# Patient Record
Sex: FEMALE | Race: BLACK OR AFRICAN AMERICAN | NOT HISPANIC OR LATINO | Employment: UNEMPLOYED | URBAN - METROPOLITAN AREA
[De-identification: names, ages, dates, MRNs, and addresses within clinical notes are randomized per-mention and may not be internally consistent; named-entity substitution may affect disease eponyms.]

---

## 2020-07-27 DIAGNOSIS — O35.9XX0 SUSPECTED FETAL ANOMALY, ANTEPARTUM, SINGLE OR UNSPECIFIED FETUS: Primary | ICD-10-CM

## 2020-10-28 ENCOUNTER — HOSPITAL ENCOUNTER (INPATIENT)
Facility: OTHER | Age: 25
LOS: 2 days | Discharge: HOME | End: 2020-10-30
Attending: OBSTETRICS & GYNECOLOGY | Admitting: OBSTETRICS & GYNECOLOGY
Payer: MEDICAID

## 2020-10-28 ENCOUNTER — ANESTHESIA EVENT (OUTPATIENT)
Dept: OBSTETRICS AND GYNECOLOGY | Facility: OTHER | Age: 25
End: 2020-10-28
Payer: MEDICAID

## 2020-10-28 ENCOUNTER — ANESTHESIA (OUTPATIENT)
Dept: OBSTETRICS AND GYNECOLOGY | Facility: OTHER | Age: 25
End: 2020-10-28
Payer: MEDICAID

## 2020-10-28 DIAGNOSIS — Z3A.38 38 WEEKS GESTATION OF PREGNANCY: ICD-10-CM

## 2020-10-28 DIAGNOSIS — Z37.9 NORMAL LABOR: ICD-10-CM

## 2020-10-28 LAB
ABO + RH BLD: NORMAL
BASOPHILS # BLD AUTO: 0.02 K/UL (ref 0–0.2)
BASOPHILS NFR BLD: 0.2 % (ref 0–1.9)
BLD GP AB SCN CELLS X3 SERPL QL: NORMAL
DIFFERENTIAL METHOD: ABNORMAL
EOSINOPHIL # BLD AUTO: 0.1 K/UL (ref 0–0.5)
EOSINOPHIL NFR BLD: 0.7 % (ref 0–8)
ERYTHROCYTE [DISTWIDTH] IN BLOOD BY AUTOMATED COUNT: 13.1 % (ref 11.5–14.5)
HCT VFR BLD AUTO: 31.7 % (ref 37–48.5)
HGB BLD-MCNC: 10.6 G/DL (ref 12–16)
IMM GRANULOCYTES # BLD AUTO: 0.02 K/UL (ref 0–0.04)
IMM GRANULOCYTES NFR BLD AUTO: 0.2 % (ref 0–0.5)
LYMPHOCYTES # BLD AUTO: 2.2 K/UL (ref 1–4.8)
LYMPHOCYTES NFR BLD: 25.7 % (ref 18–48)
MCH RBC QN AUTO: 31.3 PG (ref 27–31)
MCHC RBC AUTO-ENTMCNC: 33.4 G/DL (ref 32–36)
MCV RBC AUTO: 94 FL (ref 82–98)
MONOCYTES # BLD AUTO: 0.7 K/UL (ref 0.3–1)
MONOCYTES NFR BLD: 7.9 % (ref 4–15)
NEUTROPHILS # BLD AUTO: 5.5 K/UL (ref 1.8–7.7)
NEUTROPHILS NFR BLD: 65.3 % (ref 38–73)
NRBC BLD-RTO: 0 /100 WBC
PLATELET # BLD AUTO: 282 K/UL (ref 150–350)
PMV BLD AUTO: 12.9 FL (ref 9.2–12.9)
RBC # BLD AUTO: 3.39 M/UL (ref 4–5.4)
SARS-COV-2 RDRP RESP QL NAA+PROBE: NEGATIVE
WBC # BLD AUTO: 8.45 K/UL (ref 3.9–12.7)

## 2020-10-28 PROCEDURE — U0002 COVID-19 LAB TEST NON-CDC: HCPCS

## 2020-10-28 PROCEDURE — 25000003 PHARM REV CODE 250: Performed by: STUDENT IN AN ORGANIZED HEALTH CARE EDUCATION/TRAINING PROGRAM

## 2020-10-28 PROCEDURE — 59025 FETAL NON-STRESS TEST: CPT

## 2020-10-28 PROCEDURE — 59025 FETAL NON-STRESS TEST: CPT | Mod: 26,,, | Performed by: OBSTETRICS & GYNECOLOGY

## 2020-10-28 PROCEDURE — C1751 CATH, INF, PER/CENT/MIDLINE: HCPCS | Performed by: ANESTHESIOLOGY

## 2020-10-28 PROCEDURE — 59025 PR FETAL 2N-STRESS TEST: ICD-10-PCS | Mod: 26,,, | Performed by: OBSTETRICS & GYNECOLOGY

## 2020-10-28 PROCEDURE — 59409 OBSTETRICAL CARE: CPT | Mod: AA,,, | Performed by: ANESTHESIOLOGY

## 2020-10-28 PROCEDURE — 99285 EMERGENCY DEPT VISIT HI MDM: CPT | Mod: 25

## 2020-10-28 PROCEDURE — 59409 PR OBSTETRICAL CARE,VAG DELIV ONLY: ICD-10-PCS | Mod: AT,,, | Performed by: OBSTETRICS & GYNECOLOGY

## 2020-10-28 PROCEDURE — 87081 CULTURE SCREEN ONLY: CPT

## 2020-10-28 PROCEDURE — 99283 EMERGENCY DEPT VISIT LOW MDM: CPT | Mod: 25,,, | Performed by: OBSTETRICS & GYNECOLOGY

## 2020-10-28 PROCEDURE — 63600175 PHARM REV CODE 636 W HCPCS: Performed by: STUDENT IN AN ORGANIZED HEALTH CARE EDUCATION/TRAINING PROGRAM

## 2020-10-28 PROCEDURE — 86850 RBC ANTIBODY SCREEN: CPT

## 2020-10-28 PROCEDURE — 87147 CULTURE TYPE IMMUNOLOGIC: CPT

## 2020-10-28 PROCEDURE — 11000001 HC ACUTE MED/SURG PRIVATE ROOM

## 2020-10-28 PROCEDURE — 85025 COMPLETE CBC W/AUTO DIFF WBC: CPT

## 2020-10-28 PROCEDURE — 59409 PRA ETRICAL CARE,VAG DELIV ONLY: ICD-10-PCS | Mod: AA,,, | Performed by: ANESTHESIOLOGY

## 2020-10-28 PROCEDURE — 72200005 HC VAGINAL DELIVERY LEVEL II

## 2020-10-28 PROCEDURE — 59409 OBSTETRICAL CARE: CPT | Mod: AT,,, | Performed by: OBSTETRICS & GYNECOLOGY

## 2020-10-28 PROCEDURE — 27200710 HC EPIDURAL INFUSION PUMP SET: Performed by: ANESTHESIOLOGY

## 2020-10-28 PROCEDURE — 99283 PR EMERGENCY DEPT VISIT,LEVEL III: ICD-10-PCS | Mod: 25,,, | Performed by: OBSTETRICS & GYNECOLOGY

## 2020-10-28 PROCEDURE — 62326 NJX INTERLAMINAR LMBR/SAC: CPT | Performed by: STUDENT IN AN ORGANIZED HEALTH CARE EDUCATION/TRAINING PROGRAM

## 2020-10-28 RX ORDER — IBUPROFEN 600 MG/1
600 TABLET ORAL EVERY 6 HOURS
Status: DISCONTINUED | OUTPATIENT
Start: 2020-10-28 | End: 2020-10-30 | Stop reason: HOSPADM

## 2020-10-28 RX ORDER — OXYCODONE AND ACETAMINOPHEN 10; 325 MG/1; MG/1
1 TABLET ORAL EVERY 4 HOURS PRN
Status: DISCONTINUED | OUTPATIENT
Start: 2020-10-28 | End: 2020-10-30 | Stop reason: HOSPADM

## 2020-10-28 RX ORDER — OXYTOCIN/RINGER'S LACTATE 30/500 ML
95 PLASTIC BAG, INJECTION (ML) INTRAVENOUS ONCE
Status: DISCONTINUED | OUTPATIENT
Start: 2020-10-28 | End: 2020-10-28 | Stop reason: ALTCHOICE

## 2020-10-28 RX ORDER — MISOPROSTOL 200 UG/1
TABLET ORAL
Status: DISCONTINUED
Start: 2020-10-28 | End: 2020-10-28 | Stop reason: WASHOUT

## 2020-10-28 RX ORDER — CALCIUM CARBONATE 200(500)MG
500 TABLET,CHEWABLE ORAL 3 TIMES DAILY PRN
Status: DISCONTINUED | OUTPATIENT
Start: 2020-10-28 | End: 2020-10-28 | Stop reason: ALTCHOICE

## 2020-10-28 RX ORDER — FENTANYL CITRATE 50 UG/ML
INJECTION, SOLUTION INTRAMUSCULAR; INTRAVENOUS
Status: DISCONTINUED | OUTPATIENT
Start: 2020-10-28 | End: 2020-10-28

## 2020-10-28 RX ORDER — FAMOTIDINE 10 MG/ML
20 INJECTION INTRAVENOUS ONCE
Status: CANCELLED | OUTPATIENT
Start: 2020-10-28 | End: 2020-10-28

## 2020-10-28 RX ORDER — CEFAZOLIN SODIUM 1 G/3ML
2 INJECTION, POWDER, FOR SOLUTION INTRAMUSCULAR; INTRAVENOUS ONCE AS NEEDED
Status: DISCONTINUED | OUTPATIENT
Start: 2020-10-28 | End: 2020-10-28 | Stop reason: ALTCHOICE

## 2020-10-28 RX ORDER — BUPIVACAINE HYDROCHLORIDE 2.5 MG/ML
INJECTION, SOLUTION EPIDURAL; INFILTRATION; INTRACAUDAL
Status: DISPENSED
Start: 2020-10-28 | End: 2020-10-28

## 2020-10-28 RX ORDER — SODIUM CITRATE AND CITRIC ACID MONOHYDRATE 334; 500 MG/5ML; MG/5ML
30 SOLUTION ORAL ONCE
Status: CANCELLED | OUTPATIENT
Start: 2020-10-28 | End: 2020-10-28

## 2020-10-28 RX ORDER — SIMETHICONE 80 MG
1 TABLET,CHEWABLE ORAL EVERY 6 HOURS PRN
Status: DISCONTINUED | OUTPATIENT
Start: 2020-10-28 | End: 2020-10-30 | Stop reason: HOSPADM

## 2020-10-28 RX ORDER — DIPHENHYDRAMINE HCL 25 MG
25 CAPSULE ORAL EVERY 4 HOURS PRN
Status: DISCONTINUED | OUTPATIENT
Start: 2020-10-28 | End: 2020-10-30 | Stop reason: HOSPADM

## 2020-10-28 RX ORDER — CARBOPROST TROMETHAMINE 250 UG/ML
INJECTION, SOLUTION INTRAMUSCULAR
Status: DISCONTINUED
Start: 2020-10-28 | End: 2020-10-28 | Stop reason: WASHOUT

## 2020-10-28 RX ORDER — SODIUM CHLORIDE, SODIUM LACTATE, POTASSIUM CHLORIDE, CALCIUM CHLORIDE 600; 310; 30; 20 MG/100ML; MG/100ML; MG/100ML; MG/100ML
INJECTION, SOLUTION INTRAVENOUS CONTINUOUS
Status: DISCONTINUED | OUTPATIENT
Start: 2020-10-28 | End: 2020-10-28 | Stop reason: ALTCHOICE

## 2020-10-28 RX ORDER — FENTANYL/BUPIVACAINE/NS/PF 2MCG/ML-.1
PLASTIC BAG, INJECTION (ML) INJECTION CONTINUOUS PRN
Status: DISCONTINUED | OUTPATIENT
Start: 2020-10-28 | End: 2020-10-28

## 2020-10-28 RX ORDER — HYDROCODONE BITARTRATE AND ACETAMINOPHEN 5; 325 MG/1; MG/1
1 TABLET ORAL EVERY 4 HOURS PRN
Status: DISCONTINUED | OUTPATIENT
Start: 2020-10-28 | End: 2020-10-30 | Stop reason: HOSPADM

## 2020-10-28 RX ORDER — ONDANSETRON 8 MG/1
8 TABLET, ORALLY DISINTEGRATING ORAL EVERY 8 HOURS PRN
Status: DISCONTINUED | OUTPATIENT
Start: 2020-10-28 | End: 2020-10-30 | Stop reason: HOSPADM

## 2020-10-28 RX ORDER — ACETAMINOPHEN 325 MG/1
650 TABLET ORAL EVERY 6 HOURS PRN
Status: DISCONTINUED | OUTPATIENT
Start: 2020-10-28 | End: 2020-10-30 | Stop reason: HOSPADM

## 2020-10-28 RX ORDER — SODIUM CHLORIDE 9 MG/ML
INJECTION, SOLUTION INTRAVENOUS
Status: DISCONTINUED | OUTPATIENT
Start: 2020-10-28 | End: 2020-10-28 | Stop reason: ALTCHOICE

## 2020-10-28 RX ORDER — METHYLERGONOVINE MALEATE 0.2 MG/ML
INJECTION INTRAVENOUS
Status: DISCONTINUED
Start: 2020-10-28 | End: 2020-10-28 | Stop reason: WASHOUT

## 2020-10-28 RX ORDER — ONDANSETRON 8 MG/1
8 TABLET, ORALLY DISINTEGRATING ORAL EVERY 8 HOURS PRN
Status: DISCONTINUED | OUTPATIENT
Start: 2020-10-28 | End: 2020-10-28 | Stop reason: SDUPTHER

## 2020-10-28 RX ORDER — FENTANYL/BUPIVACAINE/NS/PF 2MCG/ML-.1
PLASTIC BAG, INJECTION (ML) INJECTION
Status: COMPLETED
Start: 2020-10-28 | End: 2020-10-28

## 2020-10-28 RX ORDER — HYDROCORTISONE 25 MG/G
CREAM TOPICAL 3 TIMES DAILY PRN
Status: DISCONTINUED | OUTPATIENT
Start: 2020-10-28 | End: 2020-10-30 | Stop reason: HOSPADM

## 2020-10-28 RX ORDER — OXYTOCIN/RINGER'S LACTATE 30/500 ML
334 PLASTIC BAG, INJECTION (ML) INTRAVENOUS ONCE
Status: COMPLETED | OUTPATIENT
Start: 2020-10-28 | End: 2020-10-28

## 2020-10-28 RX ORDER — PRENATAL WITH FERROUS FUM AND FOLIC ACID 3080; 920; 120; 400; 22; 1.84; 3; 20; 10; 1; 12; 200; 27; 25; 2 [IU]/1; [IU]/1; MG/1; [IU]/1; MG/1; MG/1; MG/1; MG/1; MG/1; MG/1; UG/1; MG/1; MG/1; MG/1; MG/1
1 TABLET ORAL DAILY
Status: DISCONTINUED | OUTPATIENT
Start: 2020-10-28 | End: 2020-10-30 | Stop reason: HOSPADM

## 2020-10-28 RX ORDER — SIMETHICONE 80 MG
1 TABLET,CHEWABLE ORAL 4 TIMES DAILY PRN
Status: DISCONTINUED | OUTPATIENT
Start: 2020-10-28 | End: 2020-10-28 | Stop reason: ALTCHOICE

## 2020-10-28 RX ORDER — OXYTOCIN/RINGER'S LACTATE 30/500 ML
95 PLASTIC BAG, INJECTION (ML) INTRAVENOUS ONCE
Status: COMPLETED | OUTPATIENT
Start: 2020-10-28 | End: 2020-10-28

## 2020-10-28 RX ORDER — FENTANYL/BUPIVACAINE/NS/PF 2MCG/ML-.1
PLASTIC BAG, INJECTION (ML) INJECTION CONTINUOUS
Status: CANCELLED | OUTPATIENT
Start: 2020-10-28

## 2020-10-28 RX ORDER — DIPHENHYDRAMINE HYDROCHLORIDE 50 MG/ML
25 INJECTION INTRAMUSCULAR; INTRAVENOUS EVERY 4 HOURS PRN
Status: DISCONTINUED | OUTPATIENT
Start: 2020-10-28 | End: 2020-10-30 | Stop reason: HOSPADM

## 2020-10-28 RX ORDER — FENTANYL CITRATE 50 UG/ML
INJECTION, SOLUTION INTRAMUSCULAR; INTRAVENOUS
Status: COMPLETED
Start: 2020-10-28 | End: 2020-10-28

## 2020-10-28 RX ORDER — DOCUSATE SODIUM 100 MG/1
200 CAPSULE, LIQUID FILLED ORAL 2 TIMES DAILY PRN
Status: DISCONTINUED | OUTPATIENT
Start: 2020-10-28 | End: 2020-10-30 | Stop reason: HOSPADM

## 2020-10-28 RX ADMIN — DEXTROSE 5 MILLION UNITS: 50 INJECTION, SOLUTION INTRAVENOUS at 06:10

## 2020-10-28 RX ADMIN — Medication 95 MILLI-UNITS/MIN: at 03:10

## 2020-10-28 RX ADMIN — DEXTROSE 3 MILLION UNITS: 50 INJECTION, SOLUTION INTRAVENOUS at 10:10

## 2020-10-28 RX ADMIN — SODIUM CHLORIDE, SODIUM LACTATE, POTASSIUM CHLORIDE, AND CALCIUM CHLORIDE 1000 ML: .6; .31; .03; .02 INJECTION, SOLUTION INTRAVENOUS at 05:10

## 2020-10-28 RX ADMIN — DEXTROSE 3 MILLION UNITS: 50 INJECTION, SOLUTION INTRAVENOUS at 02:10

## 2020-10-28 RX ADMIN — Medication 10 ML: at 06:10

## 2020-10-28 RX ADMIN — Medication 10 ML/HR: at 06:10

## 2020-10-28 RX ADMIN — OXYCODONE HYDROCHLORIDE AND ACETAMINOPHEN 1 TABLET: 10; 325 TABLET ORAL at 06:10

## 2020-10-28 RX ADMIN — Medication 334 MILLI-UNITS/MIN: at 02:10

## 2020-10-28 RX ADMIN — IBUPROFEN 600 MG: 600 TABLET, FILM COATED ORAL at 04:10

## 2020-10-28 RX ADMIN — SODIUM CHLORIDE, SODIUM LACTATE, POTASSIUM CHLORIDE, AND CALCIUM CHLORIDE: .6; .31; .03; .02 INJECTION, SOLUTION INTRAVENOUS at 09:10

## 2020-10-28 RX ADMIN — FENTANYL CITRATE 100 MCG: 50 INJECTION, SOLUTION INTRAMUSCULAR; INTRAVENOUS at 06:10

## 2020-10-28 NOTE — PROGRESS NOTES
"LABOR NOTE    S:  Pt sleeping when CNM to bedside, easily aroused. Resting comfortably with epidural, no complaints.  Family member sleeping at bedside.     O: /67   Pulse 102   Temp 97 °F (36.1 °C)   Resp 18   Ht 5' 2" (1.575 m)   Wt 89.8 kg (198 lb)   SpO2 100%   Breastfeeding No   BMI 36.21 kg/m²     GENERAL: Calm and appropriate affect  NEURO: Alert, oriented, normal speech  ABDOMEN: Nontender, Fundus palpates soft between UC's.  FHT: Baseline 120, moderate BTBV, positive accels, no decels. Cat 1, reassuring.  CTX: q 2-5 minutes  SVE: /-2      ASSESSMENT:   25 y.o.  IUP at 38w0d, FHT reassuring/ Cat 1    Patient Active Problem List   Diagnosis    Normal labor         PLAN:  Pt 5cm with SVE - discussed AROM, and pt agreeable. AROM performed using amniohook - clear fluid noted at 0830. Pt 6cm following and she tolerated well.  Continue close Maternal/Fetal monitoring  Recheck 2 hours or PRN      Arina Hsu CNM    "

## 2020-10-28 NOTE — L&D DELIVERY NOTE
[facility]  Vaginal Delivery   Operative Note    SUMMARY   Patient found to be complete and +2, after 1 set of maternal contractions,   Normal spontaneous vaginal delivery of live infant, was placed on mothers abdomen for skin to skin and bulb suctioning performed.  Infant delivered position OA over intact perineum.  Nuchal cord: No.    Spontaneous delivery of placenta and IV pitocin given noting good uterine tone.  Small bilateral labial scratches noted to be hemostatic.  Patient tolerated delivery well. Sponge needle and lap counted correctly x2.    Indications:  (spontaneous vaginal delivery)  Pregnancy complicated by:   Patient Active Problem List   Diagnosis     (spontaneous vaginal delivery)    Normal labor     Admitting GA: 38w0d    Delivery Information for Paul Gary    Birth information:  YOB: 2020   Time of birth: 2:39 PM   Sex: male   Head Delivery Date/Time: 10/28/2020  2:38 PM   Delivery type: Vaginal, Spontaneous   Gestational Age: 38w0d    Delivery Providers    Delivering clinician: Yamilet Kim,    Provider Role    BLANE Dasilva MD Betty T Parker Emily A Hillis, RN             Measurements    Weight: 2820 g  Length: 48.3 cm  Head circumference: 33 cm  Chest circumference: 31.8 cm         Apgars    Living status: Living  Apgars:  1 min.:  5 min.:  10 min.:  15 min.:  20 min.:    Skin color:  1  1       Heart rate:  2  2       Reflex irritability:  2  2       Muscle tone:  2  2       Respiratory effort:  2  2       Total:  9  9       Apgars assigned by: LEE SMITH RN         Operative Delivery    Forceps attempted?: No  Vacuum extractor attempted?: No         Shoulder Dystocia    Shoulder dystocia present?: No           Presentation    Presentation: Vertex  Position: Occiput Anterior           Interventions/Resuscitation    Method: Bulb Suctioning, Tactile Stimulation       Cord    Vessels: 3  vessels  Complications: None  Cord Blood Disposition: Sent with Baby  Gases Sent?: No  Stem Cell Collection (by MD): No       Placenta    Placenta delivery date/time: 10/28/2020 1444  Placenta removal: Spontaneous  Placenta appearance: Intact  Placenta disposition: discarded           Labor Events:       labor: No     Labor Onset Date/Time: 10/28/2020 01:00     Dilation Complete Date/Time: 10/28/2020 14:30     Start Pushing Date/Time:         Start Pushing Date/Time:       Rupture Date/Time: 10/28/20  0830         Rupture type:          Fluid Amount:       Fluid Color: Clear      Fluid Odor:       Membrane Status: ARM (Artificial Rupture)               steroids: None     Antibiotics given for GBS: Yes     Induction: none     Indications for induction:        Augmentation: amniotomy;oxytocin     Indications for augmentation:       Labor complications: None     Additional complications:          Cervical ripening:                     Delivery:      Episiotomy: None     Indication for Episiotomy:       Perineal Lacerations: None Repaired:      Periurethral Laceration:   Repaired:     Labial Laceration:   Repaired:     Sulcus Laceration:   Repaired:     Vaginal Laceration:   Repaired:     Cervical Laceration:   Repaired:     Repair suture: None     Repair # of packets: 0     Last Value - EBL - Nursing (mL): 100     Sum - EBL - Nursing (mL): 100     Last Value - EBL - Anesthesia (mL):      Calculated QBL (mL):       Vaginal Sweep Performed: Yes     Surgicount Correct: Yes       Other providers:       Anesthesia    Method: Epidural          Details (if applicable):  Trial of Labor      Categorization:      Priority:     Indications for :     Incision Type:       Additional  information:  Forceps:    Vacuum:    Breech:    Observed anomalies    Other (Comments):           Anaya Tran M.D.   OB/GYN  PGY-2
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
## 2020-10-28 NOTE — PROGRESS NOTES
"LABOR NOTE    S:  Resting comfortably. Epidural working.    O: /60   Pulse 69   Temp 97 °F (36.1 °C)   Resp 17   Ht 5' 2" (1.575 m)   Wt 89.8 kg (198 lb)   SpO2 96%   Breastfeeding No   BMI 36.21 kg/m²     GENERAL: Calm and appropriate affect  NEURO: Alert, oriented, normal speech  ABDOMEN: Nontender, Fundus palpates soft between UC's.  FHT: Baseline 115, moderate BTBV, positive accels, + early decels. Cat 1, reassuring.  CTX: q 2-3 min  SVE:       ASSESSMENT:   25 y.o.  IUP at 38w0d, FHT reassuring/ Cat 1    Patient Active Problem List   Diagnosis    Normal labor     TIMELINE:  0830: 2, AROM  1115:   1330:     PLAN:    Continue close Maternal/Fetal monitoring  Recheck 1 hours or PRN    Anaya Tran M.D.   OB/GYN  PGY-2        "

## 2020-10-28 NOTE — ED TRIAGE NOTES
Pt brought in via EMS reporting ctx for the last hour; rating pain 10/10. Pt denies VB, LOF; reports +FM. Pt placed in assessment room 2. EFM and TOCO applied. MD Joo notified.

## 2020-10-28 NOTE — PROGRESS NOTES
"LABOR NOTE    S:  Resting comfortably. Epidural working.    O: /60   Pulse 79   Temp 97 °F (36.1 °C)   Resp 16   Ht 5' 2" (1.575 m)   Wt 89.8 kg (198 lb)   SpO2 98%   Breastfeeding No   BMI 36.21 kg/m²     GENERAL: Calm and appropriate affect  NEURO: Alert, oriented, normal speech  ABDOMEN: Nontender, Fundus palpates soft between UC's.  FHT: Baseline 120, moderate BTBV, positive accels, no decels. Cat 1, reassuring.  CTX: q 2-5 minutes  SVE:       ASSESSMENT:   25 y.o.  IUP at 38w0d, FHT reassuring/ Cat 1    Patient Active Problem List   Diagnosis    Normal labor     TIMELINE:  0830: /-2, AROM  1115:     PLAN:    Continue close Maternal/Fetal monitoring  Recheck 2 hours or PRN        Winsome Bates MD/MPH  OB/GYN PGY1      "
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
## 2020-10-28 NOTE — ANESTHESIA PREPROCEDURE EVALUATION
10/28/2020  Belle Gary is a 25 y.o., female  female with IUP at 38w0d weeks gestation who c/o contractions      Patient Active Problem List   Diagnosis    Normal labor       Review of patient's allergies indicates:  No Known Allergies     No current facility-administered medications on file prior to encounter.      No current outpatient medications on file prior to encounter.       Past Surgical History:   Procedure Laterality Date    NO PAST SURGERIES         Social History     Socioeconomic History    Marital status: Single     Spouse name: Not on file    Number of children: Not on file    Years of education: Not on file    Highest education level: Not on file   Occupational History    Not on file   Social Needs    Financial resource strain: Not on file    Food insecurity     Worry: Not on file     Inability: Not on file    Transportation needs     Medical: Not on file     Non-medical: Not on file   Tobacco Use    Smoking status: Former Smoker     Types: Cigarettes   Substance and Sexual Activity    Alcohol use: Not Currently    Drug use: Not Currently     Types: Marijuana    Sexual activity: Yes     Partners: Male   Lifestyle    Physical activity     Days per week: Not on file     Minutes per session: Not on file    Stress: Not on file   Relationships    Social connections     Talks on phone: Not on file     Gets together: Not on file     Attends Religion service: Not on file     Active member of club or organization: Not on file     Attends meetings of clubs or organizations: Not on file     Relationship status: Not on file   Other Topics Concern    Not on file   Social History Narrative    Not on file         Vital Signs Range (Last 24H):  Temp:  [36.7 °C (98.1 °F)]   Pulse:  [68-69]   Resp:  [18]   BP: (123)/(76)   SpO2:  [100 %]       CBC:   Recent Labs      10/28/20  0512   WBC 8.45   RBC 3.39*   HGB 10.6*   HCT 31.7*      MCV 94   MCH 31.3*   MCHC 33.4       CMP: No results for input(s): NA, K, CL, CO2, BUN, CREATININE, GLU, MG, PHOS, CALCIUM, ALBUMIN, PROT, ALKPHOS, ALT, AST, BILITOT in the last 72 hours.    INR  No results for input(s): PT, INR, PROTIME, APTT in the last 72 hours.          Anesthesia Evaluation    I have reviewed the Patient Summary Reports.    I have reviewed the Nursing Notes. I have reviewed the NPO Status.   I have reviewed the Medications.     Review of Systems  Anesthesia Hx:  No problems with previous Anesthesia  History of prior surgery of interest to airway management or planning: Denies Family Hx of Anesthesia complications.   Denies Personal Hx of Anesthesia complications.   Social:  No Alcohol Use, Non-Smoker    Hematology/Oncology:  Hematology Normal   Oncology Normal     EENT/Dental:EENT/Dental Normal   Cardiovascular:  Cardiovascular Normal     Pulmonary:  Pulmonary Normal    Renal/:  Renal/ Normal     Hepatic/GI:  Hepatic/GI Normal    Musculoskeletal:  Musculoskeletal Normal    Neurological:  Neurology Normal    Endocrine:  Endocrine Normal    Dermatological:  Skin Normal    Psych:  Psychiatric Normal           Physical Exam  General:  Well nourished, Obesity    Airway/Jaw/Neck:  Airway Findings: Mouth Opening: Normal Tongue: Normal  General Airway Assessment: Adult       Chest/Lungs:  Chest/Lungs Findings: Clear to auscultation, Normal Respiratory Rate     Heart/Vascular:  Heart Findings: Rate: Normal  Rhythm: Regular Rhythm  Sounds: Normal        Mental Status:  Mental Status Findings:  Cooperative, Alert and Oriented         Anesthesia Plan  Type of Anesthesia, risks & benefits discussed:  Anesthesia Type:  general  Patient's Preference:   Intra-op Monitoring Plan: standard ASA monitors  Intra-op Monitoring Plan Comments:   Post Op Pain Control Plan: multimodal analgesia  Post Op Pain Control Plan Comments:   Induction:    IV  Beta Blocker:  Patient is not currently on a Beta-Blocker (No further documentation required).       Informed Consent: Patient understands risks and agrees with Anesthesia plan.  Questions answered. Anesthesia consent signed with patient.  ASA Score: 2     Day of Surgery Review of History & Physical:    H&P update referred to the surgeon.         Ready For Surgery From Anesthesia Perspective.

## 2020-10-29 LAB — BACTERIA SPEC AEROBE CULT: ABNORMAL

## 2020-10-29 PROCEDURE — 99231 PR SUBSEQUENT HOSPITAL CARE,LEVL I: ICD-10-PCS | Mod: TH,,, | Performed by: OBSTETRICS & GYNECOLOGY

## 2020-10-29 PROCEDURE — 25000003 PHARM REV CODE 250: Performed by: STUDENT IN AN ORGANIZED HEALTH CARE EDUCATION/TRAINING PROGRAM

## 2020-10-29 PROCEDURE — 99231 SBSQ HOSP IP/OBS SF/LOW 25: CPT | Mod: TH,,, | Performed by: OBSTETRICS & GYNECOLOGY

## 2020-10-29 PROCEDURE — 11000001 HC ACUTE MED/SURG PRIVATE ROOM

## 2020-10-29 RX ADMIN — IBUPROFEN 600 MG: 600 TABLET, FILM COATED ORAL at 06:10

## 2020-10-29 RX ADMIN — HYDROCODONE BITARTRATE AND ACETAMINOPHEN 1 TABLET: 5; 325 TABLET ORAL at 07:10

## 2020-10-29 RX ADMIN — HYDROCODONE BITARTRATE AND ACETAMINOPHEN 1 TABLET: 5; 325 TABLET ORAL at 09:10

## 2020-10-29 RX ADMIN — DOCUSATE SODIUM 200 MG: 100 CAPSULE, LIQUID FILLED ORAL at 08:10

## 2020-10-29 RX ADMIN — PRENATAL VIT W/ FE FUMARATE-FA TAB 27-0.8 MG 1 TABLET: 27-0.8 TAB at 08:10

## 2020-10-29 RX ADMIN — IBUPROFEN 600 MG: 600 TABLET, FILM COATED ORAL at 12:10

## 2020-10-29 RX ADMIN — HYDROCODONE BITARTRATE AND ACETAMINOPHEN 1 TABLET: 5; 325 TABLET ORAL at 03:10

## 2020-10-29 NOTE — PROGRESS NOTES
POSTPARTUM PROGRESS NOTE     Belle Gary is a 25 y.o. female PPD #1 status post Spontaneous vaginal delivery at 38w1d in a pregnancy complicated by limited PNC. Patient is doing well this morning. She denies nausea, vomiting, fever or chills.  Patient reports mild abdominal pain that is well relieved by oral pain medications. Lochia is mild and decreasing. Patient is voiding without difficulty and ambulating with no difficulty. She has passed flatus, and has not had BM.  Patient does plan to breast feed. Considering options for contraception. She desires circumcision.     Objective:       Temp:  [97.7 °F (36.5 °C)-98.5 °F (36.9 °C)] 98.5 °F (36.9 °C)  Pulse:  [] 84  Resp:  [15-18] 18  SpO2:  [92 %-100 %] 98 %  BP: (106-141)/(57-80) 115/59    General: A&Ox3, NAD  Resp: nonlabored breathing, no respiratory distress  Abd: soft, nontender, nondistended  Uterine: Fundus firm below umbilicus  Ext: No LE edema  Pscyh: Appropriate mood & affect    Lab Review  No results found for this or any previous visit (from the past 4 hour(s)).    I/O    Intake/Output Summary (Last 24 hours) at 10/29/2020 0649  Last data filed at 10/28/2020 1745  Gross per 24 hour   Intake --   Output 800 ml   Net -800 ml        Assessment:     Patient Active Problem List   Diagnosis     (spontaneous vaginal delivery)    Normal labor        Plan:   1. Postpartum care:  - Patient doing well. Continue routine management and advances.  - Continue PO pain meds. Pain well controlled.  - Heme: H/h 10/31  - Encourage ambulation  - Circumcision consents signed and scanned into chart, orders placed  - Contraception: counseled, considering options  - Lactation PRN      Dispo: As patient meets milestones, will plan to discharge PPD#1-2.    Winsome Bates MD/MPH  OB/GYN PGY1

## 2020-10-29 NOTE — ANESTHESIA POSTPROCEDURE EVALUATION
Anesthesia Post Evaluation    Patient: Belle Gary    Procedure(s) Performed: * No procedures listed *    Final Anesthesia Type: epidural    Patient location during evaluation: floor  Patient participation: Yes- Able to Participate  Level of consciousness: awake and alert  Post-procedure vital signs: reviewed and stable  Pain management: adequate  Airway patency: patent  JOHNNY mitigation strategies: Use of major conduction anesthesia (spinal/epidural) or peripheral nerve block and Multimodal analgesia  PONV status at discharge: No PONV  Anesthetic complications: no      Cardiovascular status: blood pressure returned to baseline  Respiratory status: unassisted, spontaneous ventilation and room air  Hydration status: euvolemic  Follow-up not needed.          Vitals Value Taken Time   /58 10/29/20 0746   Temp 36.7 °C (98.1 °F) 10/29/20 0746   Pulse 76 10/29/20 0746   Resp 18 10/29/20 0914   SpO2 98 % 10/29/20 0746         No case tracking events are documented in the log.      Pain/Bobby Score: Pain Rating Prior to Med Admin: 8 (10/29/2020  9:14 AM)  Pain Rating Post Med Admin: 0 (10/29/2020  7:00 AM)

## 2020-10-30 PROCEDURE — 99238 PR HOSPITAL DISCHARGE DAY,<30 MIN: ICD-10-PCS | Mod: TH,,, | Performed by: OBSTETRICS & GYNECOLOGY

## 2020-10-30 PROCEDURE — 25000003 PHARM REV CODE 250: Performed by: STUDENT IN AN ORGANIZED HEALTH CARE EDUCATION/TRAINING PROGRAM

## 2020-10-30 PROCEDURE — 99238 HOSP IP/OBS DSCHRG MGMT 30/<: CPT | Mod: TH,,, | Performed by: OBSTETRICS & GYNECOLOGY

## 2020-10-30 RX ORDER — IBUPROFEN 600 MG/1
600 TABLET ORAL EVERY 6 HOURS
Qty: 30 TABLET | Refills: 1 | Status: SHIPPED | OUTPATIENT
Start: 2020-10-30

## 2020-10-30 RX ADMIN — IBUPROFEN 600 MG: 600 TABLET, FILM COATED ORAL at 06:10

## 2020-10-30 RX ADMIN — PRENATAL VIT W/ FE FUMARATE-FA TAB 27-0.8 MG 1 TABLET: 27-0.8 TAB at 09:10

## 2020-10-30 RX ADMIN — IBUPROFEN 600 MG: 600 TABLET, FILM COATED ORAL at 11:10

## 2020-10-30 RX ADMIN — DOCUSATE SODIUM 200 MG: 100 CAPSULE, LIQUID FILLED ORAL at 09:10

## 2020-10-30 RX ADMIN — IBUPROFEN 600 MG: 600 TABLET, FILM COATED ORAL at 12:10

## 2020-10-30 NOTE — DISCHARGE SUMMARY
Delivery Discharge Summary  Obstetrics      Primary OB Clinician: Draby Dominguez MD      Admission date: 10/28/2020  Discharge date: 10/30/2020    Disposition: To home, self care    Discharge Diagnosis List:      Patient Active Problem List   Diagnosis     (spontaneous vaginal delivery)    Normal labor       Procedure:     Hospital Course:  Belle Gary is a 25 y.o. now , PPD #2 who was admitted on 10/28/2020 at 38w0d for labor. Patient was subsequently admitted to labor and delivery unit with signed consents.     Labor course was uncomplicated and resulted in  without complications.     Please see delivery note for further details. Her postpartum course was uncomplicated. On discharge day, patient's pain is controlled with oral pain medications. Pt is tolerating ambulation without SOB or CP, and regular diet without N/V. Reports lochia is mild. Denies any HA, vision changes, F/C, LE swelling. Denies any breast pain/soreness.    Pt in stable condition and ready for discharge. She has been instructed to start and/or continue medications and follow up with her obstetrics provider as listed below.    Pertinent studies:  CBC  Recent Labs   Lab 10/28/20  0512   WBC 8.45   HGB 10.6*   HCT 31.7*   MCV 94               There is no immunization history on file for this patient.     Delivery:    Episiotomy: None   Lacerations: None   Repair suture: None   Repair # of packets: 0   Blood loss (ml): 100     Birth information:  YOB: 2020   Time of birth: 2:39 PM   Sex: male   Delivery type: Vaginal, Spontaneous   Gestational Age: 38w0d    Delivery Clinician:      Other providers:       Additional  information:  Forceps:    Vacuum:    Breech:    Observed anomalies      Living?:           APGARS  One minute Five minutes Ten minutes   Skin color:         Heart rate:         Grimace:         Muscle tone:         Breathing:         Totals: 9  9        Placenta: Delivered:        appearance      Patient Instructions:   Current Discharge Medication List      START taking these medications    Details   ibuprofen (ADVIL,MOTRIN) 600 MG tablet Take 1 tablet (600 mg total) by mouth every 6 (six) hours.  Qty: 30 tablet, Refills: 1             Discharge Procedure Orders   Diet Adult Regular     Notify your health care provider if you experience any of the following:  temperature >100.4     Notify your health care provider if you experience any of the following:  persistent nausea and vomiting or diarrhea     Notify your health care provider if you experience any of the following:  severe uncontrolled pain     Notify your health care provider if you experience any of the following:  difficulty breathing or increased cough     Notify your health care provider if you experience any of the following:  severe persistent headache     Notify your health care provider if you experience any of the following:  worsening rash     Notify your health care provider if you experience any of the following:  persistent dizziness, light-headedness, or visual disturbances     Notify your health care provider if you experience any of the following:  increased confusion or weakness     Notify your health care provider if you experience any of the following:   Order Comments: Notify MD if bleeding 1 pad/hour for 2 consecutive hours.     No dressing needed     Activity as tolerated   Order Comments: Pelvic rest until cleared by MD. Nothing in vagina till cleared by MD including tampons, douching, intercourse       Follow-up Information     [facility] In 6 weeks.    Specialty: Obstetrics and Gynecology  Why: Postpartum visit  Contact information:  [facility] [place] [address] [phone]                  Lisa Julian M.D.  OB/GYN PGY-1

## 2020-10-30 NOTE — PLAN OF CARE
VSS. Pt ambulating and voiding. Pain well-controlled with motrin q6. Discharge instructions reviewed with pt and significant other. Pt will f/u in 6 weeks for PP appt. No questions/concerns.

## 2020-10-30 NOTE — PROGRESS NOTES
POSTPARTUM PROGRESS NOTE     Belle Gary is a 25 y.o. female PPD #2 status post Spontaneous vaginal delivery at 38w2d in a pregnancy complicated by limited PNC.    Patient is doing well this morning. She denies nausea, vomiting, fever or chills. Patient reports mild abdominal pain that is adequately relieved by oral pain medications. Lochia is mild to moderate and stable. Patient is voiding without difficulty and ambulating with no difficulty. She has passed flatus.    Patient does plan to breast feed. Per primary OB for contraception. She desires circumcision, performed yesterday.     Objective:       Temp:  [96.7 °F (35.9 °C)-99.2 °F (37.3 °C)] 96.7 °F (35.9 °C)  Pulse:  [67-76] 71  Resp:  [16-18] 16  SpO2:  [96 %-99 %] 99 %  BP: (107-128)/(51-60) 119/57    General:   alert, appears stated age and cooperative   Lungs:   Non-labored respirations    Heart:   regular rate and rhythm   Abdomen:  Soft, nondistended    Uterus:  firm located at the umbilicus.    Extremities: no pedal edema noted     Lab Review  No results found for this or any previous visit (from the past 4 hour(s)).    I/O    Intake/Output Summary (Last 24 hours) at 10/30/2020 0700  Last data filed at 10/29/2020 1610  Gross per 24 hour   Intake 980 ml   Output 950 ml   Net 30 ml        Assessment:     Patient Active Problem List   Diagnosis     (spontaneous vaginal delivery)    Normal labor        Plan:   1. Postpartum care:  - Patient doing well. Continue routine management and advances.  - Continue PO pain meds. Pain well controlled.  - Heme: H/h 10/31   - Encourage ambulation  - Circumcision done yesterday  - Contraception to be discussed with primary OB in [place] at 6 week PP visit  - Lactation consult PRN  - Rh +        Dispo: As patient meets milestones, will plan to discharge today.      Lisa Julian M.D.  OB/GYN PGY-1

## 2020-12-03 ENCOUNTER — TELEPHONE (OUTPATIENT)
Dept: OBSTETRICS AND GYNECOLOGY | Facility: CLINIC | Age: 25
End: 2020-12-03

## 2020-12-03 VITALS
SYSTOLIC BLOOD PRESSURE: 127 MMHG | DIASTOLIC BLOOD PRESSURE: 71 MMHG | TEMPERATURE: 9 F | HEART RATE: 70 BPM | WEIGHT: 198 LBS | OXYGEN SATURATION: 98 % | BODY MASS INDEX: 36.44 KG/M2 | RESPIRATION RATE: 17 BRPM | HEIGHT: 62 IN

## 2020-12-03 NOTE — TELEPHONE ENCOUNTER
----- Message from Courtney Gongora MD sent at 12/3/2020  2:54 PM CST -----  This mom is currently displaced from her home in [place] and is staying in [place]. Dr. Kim delivered her little one about 5 weeks ago - she would like to set up her postpartum follow up. Would you all be able to touch base with her to schedule?    Thanks so much,   EL        Spoke with patient to schedule a postpartum appointment for next Friday. Patient verbalized and understand

## 2021-02-01 PROBLEM — Z37.9 NORMAL LABOR: Status: RESOLVED | Noted: 2020-10-28 | Resolved: 2021-02-01

## 2021-09-04 NOTE — ANESTHESIA PROCEDURE NOTES
Epidural    Patient location during procedure: OB   Reason for block: primary anesthetic   Diagnosis: IUP   Start time: 10/28/2020 5:56 AM  Timeout: 10/28/2020 5:56 AM  End time: 10/28/2020 6:11 AM  Surgery related to: Vaginal Delivery    Staffing  Performing Provider: Ravindra Zarate MD  Authorizing Provider: Asuncion Garrett MD        Preanesthetic Checklist  Completed: patient identified, site marked, surgical consent, pre-op evaluation, timeout performed, IV checked, risks and benefits discussed, monitors and equipment checked, anesthesia consent given, hand hygiene performed and patient being monitored  Preparation  Patient position: sitting  Prep: ChloraPrep  Patient monitoring: Pulse Ox  Epidural  Skin Anesthetic: lidocaine 1%  Skin Wheal: 3 mL  Administration type: continuous  Approach: midline  Interspace: L3-4    Injection technique: SABRINA saline  Needle and Epidural Catheter  Needle type: Tuohy   Needle gauge: 17  Needle length: 3.5 inches  Needle insertion depth: 8 cm  Catheter type: springwound  Catheter size: 19 G  Catheter at skin depth: 12 cm  Test dose: 3 mL of lidocaine 1.5% with Epi 1-to-200,000  Additional Documentation: incremental injection, negative aspiration for heme and CSF, no paresthesia on injection, no signs/symptoms of IV or SA injection, no significant pain on injection and no significant complaints from patient  Needle localization: anatomical landmarks  Medications:  Volume per aspiration: 5 mL  Time between aspirations: 5 minutes  Assessment  Ease of block: easy  Patient's tolerance of the procedure: comfortable throughout block and no complaints  Additional Notes  Inserted Rebeca needle intending to do CSE. Patient with L-sided paresthesia on insertion. No CSF return. Did not perform spinal injection. No paresthesia with insertion of catheter. No inadvertent dural puncture with Tuohy.  Dural puncture performed with spinal needle.              
show

## 2021-09-13 LAB
BILIRUB SERPL-MCNC: NORMAL MG/DL
BLOOD URINE, POC: NEGATIVE
CLARITY, POC UA: CLEAR
COLOR, POC UA: YELLOW
GLUCOSE UR QL STRIP: NEGATIVE
KETONES UR QL STRIP: NORMAL
LEUKOCYTE EST, POC UA: NEGATIVE
NITRITE, POC UA: NEGATIVE
PH, POC UA: 6
POC BETA-HCG (QUAL): POSITIVE
PROTEIN, POC: NORMAL
SPECIFIC GRAVITY, POC UA: 1.03
UROBILINOGEN, POC UA: NORMAL

## 2021-09-28 LAB
BILIRUB SERPL-MCNC: NEGATIVE MG/DL
BLOOD URINE, POC: NEGATIVE
CLARITY, POC UA: CLEAR
COLOR, POC UA: NORMAL
GLUCOSE UR QL STRIP: NEGATIVE
KETONES UR QL STRIP: NEGATIVE
LEUKOCYTE EST, POC UA: NORMAL
NITRITE, POC UA: NEGATIVE
PH, POC UA: 6.5
PROTEIN, POC: NORMAL
SPECIFIC GRAVITY, POC UA: 1.02
UROBILINOGEN, POC UA: NORMAL

## 2021-10-21 LAB
CLARITY, POC UA: CLEAR
COLOR, POC UA: YELLOW
GLUCOSE UR QL STRIP: NEGATIVE
LEUKOCYTE EST, POC UA: NEGATIVE
NITRITE, POC UA: NEGATIVE
PROTEIN, POC: NEGATIVE

## 2021-11-05 ENCOUNTER — HISTORICAL (OUTPATIENT)
Dept: ADMINISTRATIVE | Facility: HOSPITAL | Age: 26
End: 2021-11-05

## 2021-11-05 LAB
BASOPHILS # BLD AUTO: 0.01 10*3/UL (ref 0.01–0.08)
BASOPHILS NFR BLD AUTO: 0.2 % (ref 0.1–1.2)
EOSINOPHIL # BLD AUTO: 0.04 10*3/UL (ref 0.04–0.36)
EOSINOPHIL NFR BLD AUTO: 0.7 % (ref 0.7–7)
ERYTHROCYTE [DISTWIDTH] IN BLOOD BY AUTOMATED COUNT: 13 % (ref 11–14.5)
GLUCOSE 1H P 100 G GLC PO SERPL-MCNC: 86 MG/DL (ref 70–140)
HBV SURFACE AG SERPL QL IA: NEGATIVE
HCT VFR BLD AUTO: 32.2 % (ref 36–48)
HGB BLD-MCNC: 10.7 G/DL (ref 11.8–16)
HIV 1+2 AB+HIV1 P24 AG SERPL QL IA: NORMAL
IMM GRANULOCYTES # BLD AUTO: 0.01 10*3/UL (ref 0–0.03)
IMM GRANULOCYTES NFR BLD AUTO: 0.2 % (ref 0–0.5)
LYMPHOCYTES # BLD AUTO: 1.43 10*3/UL (ref 1.16–3.74)
LYMPHOCYTES NFR BLD AUTO: 26 % (ref 20–55)
MCH RBC QN AUTO: 30.1 PG (ref 27–34)
MCHC RBC AUTO-ENTMCNC: 33.2 G/DL (ref 31–37)
MCV RBC AUTO: 90.4 FL (ref 79–99)
MONOCYTES # BLD AUTO: 0.35 10*3/UL (ref 0.24–0.36)
MONOCYTES NFR BLD AUTO: 6.4 % (ref 4.7–12.5)
NEUTROPHILS # BLD AUTO: 3.66 10*3/UL (ref 1.56–6.13)
NEUTROPHILS NFR BLD AUTO: 66.5 % (ref 37–73)
PLATELET # BLD AUTO: 323 10*3/UL (ref 140–371)
PMV BLD AUTO: 11.8 FL (ref 9.4–12.4)
RBC # BLD AUTO: 3.56 10*6/UL (ref 4–5.1)
RPR SER QL: NORMAL
TSH SERPL-ACNC: 0.82 UIU/ML (ref 0.36–3.74)
WBC # SPEC AUTO: 5.5 10*3/UL (ref 4–11.5)

## 2022-04-10 ENCOUNTER — HISTORICAL (OUTPATIENT)
Dept: ADMINISTRATIVE | Facility: HOSPITAL | Age: 27
End: 2022-04-10
Payer: MEDICAID

## 2022-04-26 VITALS
BODY MASS INDEX: 38.3 KG/M2 | HEIGHT: 62 IN | WEIGHT: 208.13 LBS | SYSTOLIC BLOOD PRESSURE: 122 MMHG | DIASTOLIC BLOOD PRESSURE: 62 MMHG

## 2022-05-02 ENCOUNTER — HISTORICAL (OUTPATIENT)
Dept: ADMINISTRATIVE | Facility: HOSPITAL | Age: 27
End: 2022-05-02
Payer: MEDICAID

## 2022-05-03 NOTE — HISTORICAL [facility] CERNER
This is a historical note converted from [employer]. Formatting and pictures may have been removed.  Please reference [employer] for original formatting and attached multimedia. Chief Complaint  Referral from [facility] in [facility] LMP 21.  History of Present Illness  27yo BF  transfer from [facility] in [facility]?at 25n7aon LMP of 21 in today for New ob. Reports pap smear 2021. MICHELLE for records.  LMP/EGA/MADISON  Gestational Age (EGA) and MADISON?? ? * Note: EGA calculated as of 2021  ?  MADISON:?2021???EGA*:?28 weeks 5 days ? ? ? ? ? ?Type:?Authoritative??????Method Date:?2021  ?  ?? ? ?Method:?Reported EGA/MADISON?(2021)  ?? ? ?Confirmation:?Confirmed  ?? ? ?Description:?Due date  ?? ? ?Comments:?--  ?? ? ?Entered by:?Muna Edouard LPN on 2021?  ?  Other MADISON Calculations for this Pregnancy:  ?? ? ?No additional MADISON calculations have been recorded for this pregnancy  Gynecological History  Menstrual Status Intake: Due to pregnancy  STIs/STDs: No  Abnormal Pap: No  Dyspareunia: No  Postcoital Bleeding: No  Dysuria: No  Additional GYN Information: PAP 2021 WNL/patient  Discharge OB: white d/c  Urinary Incontinence: Denies  Sexually Active: Yes  Review of Systems  General/Constitutional:  Chills?denies. Fatigue/weakness?denies?. Fever?denies?. Night sweats?denies?.  Respiratory:  Cough?denies?. Hemoptysis?denies?. SOB?denies?. Sputum production?denies?. Wheezing?denies?.  Cardiovascular:  Chest pain?denies?. Dizziness?denies?.?Palpitations?denies?. Swelling in hands/feet?denies?.?  Gastrointestinal:  Abdominal pain?denies?. Blood in stool?denies?. Constipation?denies?. Diarrhea?denies?. Heartburn?denies?. Nausea?denies?. Vomiting?denies?  Genitourinary:  Incontinence?denies?. Blood in urine?denies. Frequent urination?denies?. Painful urination?denies.  Gynecologic:  Irregular menses?denies. ?Heavy bleeding ?denies. ?Painful menses?denies. ?Vaginal discharge?denies?. Vaginal odor?denies.  Vaginal lesion?denies. ?Pelvic pain?denies?. Decreased libido?denies. Vulvar lesion?denies?. Prolapse of genital organs?denies?. Painful intercourse?denies?.  Psychiatric:  Depression?denies. Anxiety?denies  Physical Exam  Vitals & Measurements  T:?36.2? ?C (Temporal Artery)? BP:?130/68?  HT:?157.00?cm? WT:?93.200?kg? BMI:?37.81?  Gen: NAD  Abd: Gravid, NT  Ext: No CCE  FHT:?128  BPD:28w6d , 43%tile  HC: 27w5d, 4%tile  AC: 29w6d, 76%tile  FL: 265w5d, 2%tile  EFW:?28w1d , 31%tile  Assessment/Plan  1.?Maternal obesity, antepartum?O99.210  2.?28 weeks gestation of pregnancy?Z3A.28  3.?Insufficient prenatal care?O09.30  Prenatal counseling  Discussed appropriate weight gain for pregnancy  Tobacco avoidance/cessation  Illicit drug avoidance  PNL  PNV  TSH  Nataly  GC/CZ/TV urine  ?   MICHELLE for records  ?   RTC 4 weeks.   OB History  Pregnancy History???(3,0,0,3)?? ??  Pregnancy # 1  Baby 1?????????????Outcome Date:?2014????? Outcome:?Live Birth  ???Outcome or Result:?Vaginal  ???Gender:?Male????????Gest Age:?Fullterm ??????Wt:??2466 g  ???Hospital:?--????????[facility] Labor:?--  ???Juan Name:?--?????Babys Father:?--  ?  Pregnancy # 2  Baby 1?????????????Outcome Date:?2018????? Outcome:?Live Birth  ???Outcome or Result:?Vaginal  ???Gender:?Female????????Gest Age:?Fullterm ??????Wt:??2268 g  ???Hospital:?--????????[facility] Labor:?--  ???Juan Name:?--?????Babys Father:?--  ?  Pregnancy # 3  Baby 1?????????????Outcome Date:?10/28/2020????? Outcome:?Live Birth  ???Outcome or Result:?Vaginal  ???Gender:?Male????????Gest Age:?Fullterm ??????Wt:??2722 g  ???Hospital:?--????????[facility] Labor:?--  ???Juan Name:?--?????Babys Father:?--  Problem List/Past Medical History  Ongoing  Insufficient prenatal care  Maternal obesity, antepartum  Pregnant  Historical  Pregnant  Pregnant  Pregnant  Medications  No active medications  Allergies  No Known Allergies  Social History  Abuse/Neglect  No,  09/13/2021  Alcohol  Never, 09/13/2021  Sexual  Sexually active: Yes., 09/13/2021  Substance Use  Never, 09/13/2021  Tobacco  Never (less than 100 in lifetime), N/A, 09/13/2021  Family History  Cervical cancer: Negative: Mother.  Ovarian cancer: Negative: Mother.  Primary malignant neoplasm of breast: Negative: Mother and Father.  Primary malignant neoplasm of colon: Negative: Mother and Father.  Primary malignant neoplasm of female genital organ: Negative: Mother.  Uterine cancer: Negative: Mother.  Immunizations  Vaccine Date Status   meningococcal conjugate vaccine 04/25/2012 Recorded   tetanus/diphtheria/pertussis, acel(Tdap) 09/13/2006 Recorded   poliovirus vaccine, inactivated 09/02/1999 Recorded   measles/mumps/rubella virus vaccine 09/02/1999 Recorded   diphtheria/pertussis, acel/tetanus ped 09/02/1999 Recorded   poliovirus vaccine, live, trivalent 03/25/1998 Recorded   measles/mumps/rubella virus vaccine 06/12/1996 Recorded   poliovirus vaccine, inactivated 1995 Recorded   hepatitis B pediatric vaccine 1995 Recorded   diphtheria/pertussis, acel/tetanus ped 1995 Recorded   poliovirus vaccine, inactivated 1995 Recorded   hepatitis B pediatric vaccine 1995 Recorded   diphtheria/pertussis, acel/tetanus ped 1995 Recorded   poliovirus vaccine, inactivated 1995 Recorded   diphtheria/pertussis, acel/tetanus ped 1995 Recorded   hepatitis B pediatric vaccine 1995 Recorded   Health Maintenance  Health Maintenance  ???Pending?(in the next year)  ??? ??OverDue  ??? ? ? ?Tetanus Vaccine due??09/13/16??and every 10??year(s)  ??? ??Due?  ??? ? ? ?ADL Screening due??09/13/21??and every 1??year(s)  ??? ? ? ?Cervical Cancer Screening due??09/13/21??Unknown Frequency  ??? ? ? ?Diabetes Screening due??09/13/21??Unknown Frequency  ??? ??Due In Future?  ??? ? ? ?Obesity Screening not due until??01/01/22??and every 1??year(s)  ??? ? ? ?Alcohol Misuse Screening not due  until??01/02/22??and every 1??year(s)  ??? ? ? ?Depression Screening not due until??04/12/22??and every 1??year(s)  ???Satisfied?(in the past 1 year)  ??? ??Satisfied?  ??? ? ? ?Alcohol Misuse Screening on??09/13/21.??Satisfied by Muna Edouard LPN  ??? ? ? ?Blood Pressure Screening on??09/13/21.??Satisfied by Muna Edouard LPN  ??? ? ? ?Body Mass Index Check on??09/13/21.??Satisfied by Muna Edouard LPN  ??? ? ? ?Obesity Screening on??09/13/21.??Satisfied by Muna Edouard LPN  ?

## 2022-09-18 ENCOUNTER — HISTORICAL (OUTPATIENT)
Dept: ADMINISTRATIVE | Facility: HOSPITAL | Age: 27
End: 2022-09-18
Payer: MEDICAID

## 2024-05-17 ENCOUNTER — TELEPHONE (OUTPATIENT)
Dept: OBSTETRICS AND GYNECOLOGY | Facility: CLINIC | Age: 29
End: 2024-05-17
Payer: MEDICAID

## 2024-05-17 NOTE — TELEPHONE ENCOUNTER
5/17/24 - Obtained New OB information from patient, and patient informed that the nurse will give her a call to schedule ultrasound if accepted in delivery month.

## 2024-05-17 NOTE — TELEPHONE ENCOUNTER
----- Message from Jami Gotti sent at 5/17/2024 12:04 PM CDT -----  Contact: self  Type: Staff Message    Caller: Belle Gary  Call Back Number: [phone]  Nature of the Call: pt 17 weeks pregnant wanting to est care and schedule an apt   Additional Information: na

## 2024-05-23 DIAGNOSIS — Z34.92 ENCOUNTER FOR PREGNANCY RELATED EXAMINATION, SECOND TRIMESTER: Primary | ICD-10-CM

## 2024-05-28 ENCOUNTER — PROCEDURE VISIT (OUTPATIENT)
Dept: OBSTETRICS AND GYNECOLOGY | Facility: CLINIC | Age: 29
End: 2024-05-28
Payer: MEDICAID

## 2024-05-28 DIAGNOSIS — Z34.92 ENCOUNTER FOR PREGNANCY RELATED EXAMINATION, SECOND TRIMESTER: ICD-10-CM

## 2024-05-28 PROCEDURE — 76805 OB US >/= 14 WKS SNGL FETUS: CPT | Mod: S$GLB,,, | Performed by: OBSTETRICS & GYNECOLOGY

## 2024-05-30 LAB
ABS NRBC COUNT: 0 X 10 3/UL (ref 0–0.01)
ABSOLUTE BASOPHIL: 0.02 X 10 3/UL (ref 0–0.22)
ABSOLUTE EOSINOPHIL: 0.08 X 10 3/UL (ref 0.04–0.54)
ABSOLUTE IMMATURE GRAN: 0.01 X 10 3/UL (ref 0–0.04)
ABSOLUTE LYMPHOCYTE: 1.54 X 10 3/UL (ref 0.86–4.75)
ABSOLUTE MONOCYTE: 0.36 X 10 3/UL (ref 0.22–1.08)
AMPHETAMINES (500): NEGATIVE NG/ML
ANTIBODY SCREEN: NEGATIVE
BARBITURATES (200): NEGATIVE NG/ML
BASOPHILS NFR BLD: 0.3 % (ref 0.2–1.2)
BENZODIAZEPINES: NEGATIVE NG/ML
BLOOD GROUPING: NORMAL
BLOOD TYPE (D): POSITIVE
COCAINE (150): NEGATIVE NG/ML
EOSINOPHIL NFR BLD: 1.4 % (ref 0.7–7)
HBV SURFACE AG SERPL QL IA: NONREACTIVE
HCT VFR BLD AUTO: 33 % (ref 37–47)
HCV IGG SERPL QL IA: NONREACTIVE
HGB BLD-MCNC: 10.9 G/DL (ref 12–16)
HIV 1+2 AB+HIV1 P24 AG SERPL QL IA: NONREACTIVE
IMMATURE GRANULOCYTES: 0.2 % (ref 0–0.5)
LYMPHOCYTES NFR BLD: 26.1 % (ref 19.3–53.1)
MARIJUANA, THC (50): NEGATIVE NG/ML
MCH RBC QN AUTO: 31.9 PG (ref 27–32)
MCHC RBC AUTO-ENTMCNC: 33 G/DL (ref 32–36)
MCV RBC AUTO: 96.5 FL (ref 82–100)
METHADONE: NEGATIVE NG/ML
MONOCYTES NFR BLD: 6.1 % (ref 4.7–12.5)
NEUTROPHILS # BLD AUTO: 3.88 X 10 3/UL (ref 2.15–7.56)
NEUTROPHILS NFR BLD: 65.9 % (ref 34–71.1)
NUCLEATED RED BLOOD CELLS: 0 /100 WBC (ref 0–0.2)
OPIATES: NEGATIVE NG/ML
OXYCODONE: NEGATIVE NG/ML
PH: 6.2 (ref 4.5–8)
PHENCYCLIDINE (25): NEGATIVE NG/ML
PLATELET # BLD AUTO: 265 X 10 3/UL (ref 135–400)
RBC # BLD AUTO: 3.42 X 10 6/UL (ref 4.2–5.4)
RDW-SD: 46.3 FL (ref 37–54)
RUBELLA IGG SCREEN: NORMAL
SICKLE CELL PREP: NEGATIVE
SYPHILIS TREPONEMAL ANTIBODY: NONREACTIVE
URINE CREATININE D/S: 11.5 MG/DL
URINE CULTURE, ROUTINE: NORMAL
WBC # BLD: 5.89 X 10 3/UL (ref 4.3–10.8)

## 2024-06-04 ENCOUNTER — INITIAL PRENATAL (OUTPATIENT)
Dept: OBSTETRICS AND GYNECOLOGY | Facility: CLINIC | Age: 29
End: 2024-06-04
Payer: MEDICAID

## 2024-06-04 VITALS
SYSTOLIC BLOOD PRESSURE: 122 MMHG | DIASTOLIC BLOOD PRESSURE: 75 MMHG | WEIGHT: 203 LBS | HEART RATE: 83 BPM | BODY MASS INDEX: 37.36 KG/M2

## 2024-06-04 DIAGNOSIS — Z34.83 ENCOUNTER FOR SUPERVISION OF NORMAL PREGNANCY IN MULTIGRAVIDA IN THIRD TRIMESTER: Primary | ICD-10-CM

## 2024-06-04 PROCEDURE — 99203 OFFICE O/P NEW LOW 30 MIN: CPT | Mod: TH,S$GLB,, | Performed by: OBSTETRICS & GYNECOLOGY

## 2024-06-04 RX ORDER — B-COMPLEX WITH VITAMIN C
1 TABLET ORAL
COMMUNITY
Start: 2024-03-07

## 2024-06-04 NOTE — PROGRESS NOTES
Subjective:       Patient ID: Belle Gary is a 29 y.o.  at 18w6d   Chief Complaint:  Initial Prenatal Visit      History of Present Illness  here for new ob exam.  Labs and history were reviewed with the patient today  No complaints      Past Medical History:   Diagnosis Date    No known health problems        Past Surgical History:   Procedure Laterality Date    NO PAST SURGERIES         OB:    OB History    Para Term  AB Living   5 4 4     4   SAB IAB Ectopic Multiple Live Births         0 4      # Outcome Date GA Lbr [facility]/2nd Weight Sex Type Anes PTL Lv   5 Current            4 Term  37w0d    Vag-Spont   MARCIA   3 Term 10/28/20 38w0d 13:30 / 00:09 2.82 kg (6 lb 3.5 oz) M Vag-Spont EPI N MARCIA   2 Term 18 38w6d  2.353 kg (5 lb 3 oz) F Vag-Spont  N MARCIA   1 Term 14 39w0d  2.466 kg (5 lb 7 oz) M Vag-Spont  N MARCIA     Gyn: no STD, never had abn pap   Meds:   Current Outpatient Medications:     PRENATAL VITAMIN 27 mg iron- 0.8 mg Tab, Take 1 tablet by mouth., Disp: , Rfl:     All: Review of patient's allergies indicates:  No Known Allergies    SH:   Social History     Tobacco Use    Smoking status: Former     Types: Cigarettes    Smokeless tobacco: Not on file   Substance Use Topics    Alcohol use: Not Currently      FH: family history is not on file.      Review of Systems  nml 1st trimester sx- sob, dec excercixe tolerance, fatigue and nausea  Neg for vag bleed, dc, vomiting, cp, lof, fever, chills, ns, visual changes, swelling, headaches, constipation/diarrhea, dysuria, freq/urgency of urination     Objective:     Vitals:    24 1023   BP: 122/75   Pulse: 83   Weight: 92.1 kg (203 lb)       NAD  NCAT  pupils normal size  Skin nml no rashes or lesions  No resp distress, resp even and unlabored  nt nd, no rebound no guarding  nml ext fem gent, normal cvx uterus and adnexa, no dc or bleeding  nml appearing rectum  No cyanosis or clubbing, edema appropriate for  pregn    Uterus size approp for gest age         Assessment:        1. Encounter for supervision of normal pregnancy in multigravida in third trimester               Plan:      Encounter for supervision of normal pregnancy in multigravida in third trimester  -     Liquid-based pap smear, screening  -     Maternal Serum, Quad Screen; Future; Expected date: 06/04/2024           Pain fever bleeding precautions  Encouraged PNV  rtc 4 wks

## 2024-06-06 LAB
AFP MOM: 1.45
AFP, SERUM: 65.1 NG/ML
AGE RISK DOWN SYNDROME: NORMAL
CALCULATED GESTATIONAL AGE: NORMAL
CIGARETTE SMOKER: NO
COLLECTION DATE: NORMAL
COMMENT: 18.9 WEEKS
DATE OF BIRTH: NORMAL
DONOR AGE: EGG RETRIEVAL: NORMAL
DONOR EGG: NO
EDD DETERMINED BY: NORMAL
EST'D DATE OF DELIVERY: NORMAL
ESTRIOL MOM: 1.2
ESTRIOL, FREE: 1.86 NG/ML
HCG MOM: 0.92
HCG, SERUM: 19.38 IU/ML
HX OF NEURAL TUBE DEFECTS: NO
INHIBIN A MOM: 1.9
INHIBIN A, DIMERIC: 253 PG/ML
INSULIN DEPEND DIABETIC: NO
INTERPRETATION: NORMAL
Lab: NORMAL
MATERNAL WEIGHT: 200 LBS
MOTHER'S ETHNIC ORIGIN: NORMAL
MSS DOWN SYNDROME RISK: NORMAL
MSS TRISOMY 18 RISK: NORMAL
NUMBER OF FETUSES: 1
OTHER ETHNIC INFORMATION: NORMAL
PREGNANCY RESULT OF IVF: NO
PREV PREGNANCY DOWN SYND: NO
REPEAT SPECIMEN: NO
RISK FOR ONTD: NORMAL

## 2024-06-07 LAB
CHLAMYDIA: NEGATIVE
GONORRHEA: NEGATIVE
Lab: NORMAL
SOURCE: NORMAL
SOURCE: NORMAL
TRICHOMONAS AMPLIFIED: NEGATIVE

## 2024-06-12 ENCOUNTER — PATIENT MESSAGE (OUTPATIENT)
Dept: OTHER | Facility: OTHER | Age: 29
End: 2024-06-12
Payer: MEDICAID

## 2024-06-27 DIAGNOSIS — Z34.92 ENCOUNTER FOR PREGNANCY RELATED EXAMINATION IN SECOND TRIMESTER: Primary | ICD-10-CM

## 2024-07-03 ENCOUNTER — ROUTINE PRENATAL (OUTPATIENT)
Dept: OBSTETRICS AND GYNECOLOGY | Facility: CLINIC | Age: 29
End: 2024-07-03
Payer: MEDICAID

## 2024-07-03 ENCOUNTER — PROCEDURE VISIT (OUTPATIENT)
Dept: OBSTETRICS AND GYNECOLOGY | Facility: CLINIC | Age: 29
End: 2024-07-03
Payer: MEDICAID

## 2024-07-03 VITALS
WEIGHT: 209 LBS | SYSTOLIC BLOOD PRESSURE: 121 MMHG | DIASTOLIC BLOOD PRESSURE: 75 MMHG | HEART RATE: 86 BPM | BODY MASS INDEX: 38.46 KG/M2

## 2024-07-03 DIAGNOSIS — Z34.82 ENCOUNTER FOR SUPERVISION OF NORMAL PREGNANCY IN MULTIGRAVIDA IN SECOND TRIMESTER: Primary | ICD-10-CM

## 2024-07-03 DIAGNOSIS — O99.019 ANTEPARTUM ANEMIA: ICD-10-CM

## 2024-07-03 DIAGNOSIS — Z34.92 ENCOUNTER FOR PREGNANCY RELATED EXAMINATION IN SECOND TRIMESTER: ICD-10-CM

## 2024-07-03 PROCEDURE — 76805 OB US >/= 14 WKS SNGL FETUS: CPT | Mod: S$GLB,,, | Performed by: OBSTETRICS & GYNECOLOGY

## 2024-07-03 RX ORDER — FERROUS SULFATE 325(65) MG
325 TABLET, DELAYED RELEASE (ENTERIC COATED) ORAL DAILY
Qty: 30 TABLET | Refills: 10 | Status: SHIPPED | OUTPATIENT
Start: 2024-07-03

## 2024-07-03 RX ORDER — ASCORBIC ACID, CHOLECALCIFEROL, .ALPHA.-TOCOPHEROL ACETATE, DL-, PYRIDOXINE HYDROCHLORIDE, FOLIC ACID, CYANOCOBALAMIN, BIOTIN, CALCIUM CARBONATE, FERROUS ASPARTO GLYCINATE, IRON, POTASSIUM IODIDE, MAGNESIUM OXIDE, DOCONEXENT AND LOWBUSH BLUEBERRY 60; 1000; 10; 26; 400; 13; 280; 80; 9; 9; 150; 25; 350; 25; 600 MG/1; [IU]/1; [IU]/1; MG/1; UG/1; UG/1; UG/1; MG/1; MG/1; MG/1; UG/1; MG/1; MG/1; MG/1; UG/1
1 CAPSULE, GELATIN COATED ORAL DAILY
Qty: 30 CAPSULE | Refills: 10 | Status: SHIPPED | OUTPATIENT
Start: 2024-07-03

## 2024-07-03 NOTE — PROGRESS NOTES
Subjective:       Patient ID: Belle Gary is a 29 y.o.  at 23w0d      Chief Complaint:  Routine Prenatal Visit      History of Present Illness  No complaints. Reports normal fetal movement. Labs and history reviewed with pt.       Review of Systems  Denies n/v, f/c, dysuria, contractions,   VD, VB, round ligament pain, headaches      OB History          5    Para   4    Term   4            AB        Living   4         SAB        IAB        Ectopic        Multiple   0    Live Births   4                   Objective:     Vitals:    24 1020   BP: 121/75   Pulse: 86     Wt Readings from Last 3 Encounters:   24 94.8 kg (209 lb)   24 92.1 kg (203 lb)   10/21/21 94.4 kg (208 lb 1.8 oz)        nad  NCAT  pupils normal size  Skin nml no rashes or lesions  No resp distress, resp even and unlabored  Gravid nt, no rebound no guarding  No cyanosis or clubbing, edema appropriate for pregn  FH AGA  FHT: 155 by u/s        Assessment:        1. Encounter for supervision of normal pregnancy in multigravida in second trimester    2. Antepartum anemia                Plan:        Encounter for supervision of normal pregnancy in multigravida in second trimester  -     prenatal vit 87-iron-folic-dha (PRENATE MINI, FERR ASP GLYCIN,) 18-1-350 mg Cap; Take 1 capsule by mouth once daily.  Dispense: 30 capsule; Refill: 10  -     Glucose, 1 Hr Post 50 GM; Future; Expected date: 2024    Antepartum anemia  -     ferrous sulfate 325 (65 FE) MG EC tablet; Take 1 tablet (325 mg total) by mouth once daily.  Dispense: 30 tablet; Refill: 10         Anatomy confirmed on ultrasound   Encouraged PNV  Pain, fever, bleeding precautions   Follow up in about 4 weeks (around 2024).

## 2024-07-10 ENCOUNTER — PATIENT MESSAGE (OUTPATIENT)
Dept: OTHER | Facility: OTHER | Age: 29
End: 2024-07-10
Payer: MEDICAID

## 2024-07-24 ENCOUNTER — PATIENT MESSAGE (OUTPATIENT)
Dept: OTHER | Facility: OTHER | Age: 29
End: 2024-07-24
Payer: MEDICAID

## 2024-08-07 ENCOUNTER — PATIENT MESSAGE (OUTPATIENT)
Dept: OTHER | Facility: OTHER | Age: 29
End: 2024-08-07
Payer: MEDICAID

## 2024-08-14 ENCOUNTER — TELEPHONE (OUTPATIENT)
Dept: OBSTETRICS AND GYNECOLOGY | Facility: CLINIC | Age: 29
End: 2024-08-14
Payer: MEDICAID

## 2024-08-14 NOTE — TELEPHONE ENCOUNTER
--      Pt is aware of her appt on 8/19 at 1:30. Luisa      --- Message from Shari Soto sent at 8/14/2024 10:11 AM CDT -----  Regarding: appt  Name of who is calling:   Belle      What is the request in detail: pt I requesting a call back in ref to rescheduling missed appt on   07/24/2024    Can the clinic reply by [facility]:yes      What number to call back if not [facility]: [phone]

## 2024-08-19 ENCOUNTER — ROUTINE PRENATAL (OUTPATIENT)
Dept: OBSTETRICS AND GYNECOLOGY | Facility: CLINIC | Age: 29
End: 2024-08-19
Payer: MEDICAID

## 2024-08-19 VITALS
DIASTOLIC BLOOD PRESSURE: 64 MMHG | SYSTOLIC BLOOD PRESSURE: 99 MMHG | HEART RATE: 80 BPM | WEIGHT: 208 LBS | BODY MASS INDEX: 38.28 KG/M2

## 2024-08-19 DIAGNOSIS — B37.9 YEAST INFECTION: ICD-10-CM

## 2024-08-19 DIAGNOSIS — B37.2 YEAST INFECTION OF THE SKIN: ICD-10-CM

## 2024-08-19 DIAGNOSIS — Z34.83 ENCOUNTER FOR SUPERVISION OF NORMAL PREGNANCY IN MULTIGRAVIDA IN THIRD TRIMESTER: Primary | ICD-10-CM

## 2024-08-19 RX ORDER — NYSTATIN 100000 [USP'U]/G
POWDER TOPICAL
Qty: 60 G | Refills: 0 | Status: SHIPPED | OUTPATIENT
Start: 2024-08-19

## 2024-08-19 RX ORDER — TERCONAZOLE 4 MG/G
1 CREAM VAGINAL NIGHTLY
Qty: 45 G | Refills: 0 | Status: SHIPPED | OUTPATIENT
Start: 2024-08-19 | End: 2024-08-26

## 2024-08-19 NOTE — PROGRESS NOTES
CC: Follow-Up OB    HPI:   29 y.o.  at 29w5d with EDC of 10/30/2024, by Other Basis, here for her follow up OB visit.  Complains of vaginal irritation.     Pregnancy ROS:  Positive fetal movement   Negative leakage of fluid   Negative vaginal bleeding   Negative headache, vision changes, RUQ pain, epigastric pain  Negative contractions/abdominal pain   Positive pelvic pressure     Physical Exam:  Prenatal Vitals  BP: 99/64  Weight: 94.3 kg (208 lb)  Fetal Heart Rate: 150's    Wt Readings from Last 3 Encounters:   24 94.3 kg (208 lb)   24 94.8 kg (209 lb)   24 92.1 kg (203 lb)       Body mass index is 38.28 kg/m².    General: NAD, well developed, well nourished  Psych: alert and oriented to person, time and place, normal affect  HEENT: normocephalic, atraumatic  Abd: Gravid, soft, NT, ND  Skin: warm, dry  Neuro: normal gait, gross motor function intact  Cvx closed + yeast like d/c +intertrigo   No cyanosis, clubbing or edema    FHTs: 150s  FH: AGA    Maternal Blood Type: O POS    Slide examined under the microscope and there were per HPF  Clue cells-rare    Hyphae+many  Trichomonas -none  WBC-0-5      ASSESSMENT: 29 y.o.  @ 29w5d with   1. Encounter for supervision of normal pregnancy in multigravida in third trimester    2. Yeast infection of the skin    3. Yeast infection         PLAN:  Encounter for supervision of normal pregnancy in multigravida in third trimester  -     CBC auto differential; Future; Expected date: 2024  -     VFC-Tdap (BOOSTRIX) vaccine 0.5 mL    Yeast infection of the skin  -     nystatin (MYCOSTATIN) powder; Apply to affected area 3 times daily  Dispense: 60 g; Refill: 0    Yeast infection  -     terconazole (TERAZOL 7) 0.4 % Crea; Place 1 applicator vaginally every evening. for 7 days  Dispense: 45 g; Refill: 0    Needs GTT   Pain, fever, bleeding precautions  Labor, Fetal movement, and Preeclampsia precautions  Cont PNV  Return to clinic in 2  weeks

## 2024-08-20 LAB
ABS NRBC COUNT: 0 X 10 3/UL (ref 0–0.01)
ABSOLUTE BASOPHIL: 0.01 X 10 3/UL (ref 0–0.22)
ABSOLUTE EOSINOPHIL: 0.05 X 10 3/UL (ref 0.04–0.54)
ABSOLUTE IMMATURE GRAN: 0.01 X 10 3/UL (ref 0–0.04)
ABSOLUTE LYMPHOCYTE: 1.63 X 10 3/UL (ref 0.86–4.75)
ABSOLUTE MONOCYTE: 0.31 X 10 3/UL (ref 0.22–1.08)
BASOPHILS NFR BLD: 0.2 % (ref 0.2–1.2)
EOSINOPHIL NFR BLD: 1.1 % (ref 0.7–7)
HCT VFR BLD AUTO: 33.3 % (ref 37–47)
HGB BLD-MCNC: 10.9 G/DL (ref 12–16)
IMMATURE GRANULOCYTES: 0.2 % (ref 0–0.5)
LYMPHOCYTES NFR BLD: 35.7 % (ref 19.3–53.1)
MCH RBC QN AUTO: 31 PG (ref 27–32)
MCHC RBC AUTO-ENTMCNC: 32.7 G/DL (ref 32–36)
MCV RBC AUTO: 94.6 FL (ref 82–100)
MONOCYTES NFR BLD: 6.8 % (ref 4.7–12.5)
NEUTROPHILS # BLD AUTO: 2.56 X 10 3/UL (ref 2.15–7.56)
NEUTROPHILS NFR BLD: 56 % (ref 34–71.1)
NUCLEATED RED BLOOD CELLS: 0 /100 WBC (ref 0–0.2)
PLATELET # BLD AUTO: 167 X 10 3/UL (ref 135–400)
RBC # BLD AUTO: 3.52 X 10 6/UL (ref 4.2–5.4)
RDW-SD: 44 FL (ref 37–54)
WBC # BLD: 4.57 X 10 3/UL (ref 4.3–10.8)

## 2024-08-21 ENCOUNTER — PATIENT MESSAGE (OUTPATIENT)
Dept: OTHER | Facility: OTHER | Age: 29
End: 2024-08-21
Payer: MEDICAID

## 2024-09-03 ENCOUNTER — PATIENT MESSAGE (OUTPATIENT)
Dept: OBSTETRICS AND GYNECOLOGY | Facility: CLINIC | Age: 29
End: 2024-09-03

## 2024-09-04 ENCOUNTER — PATIENT MESSAGE (OUTPATIENT)
Dept: OTHER | Facility: OTHER | Age: 29
End: 2024-09-04
Payer: MEDICAID

## 2024-09-10 ENCOUNTER — ROUTINE PRENATAL (OUTPATIENT)
Dept: OBSTETRICS AND GYNECOLOGY | Facility: CLINIC | Age: 29
End: 2024-09-10
Payer: MEDICAID

## 2024-09-10 VITALS
SYSTOLIC BLOOD PRESSURE: 114 MMHG | HEART RATE: 77 BPM | BODY MASS INDEX: 38.46 KG/M2 | WEIGHT: 209 LBS | DIASTOLIC BLOOD PRESSURE: 72 MMHG

## 2024-09-10 DIAGNOSIS — Z34.83 ENCOUNTER FOR SUPERVISION OF NORMAL PREGNANCY IN MULTIGRAVIDA IN THIRD TRIMESTER: Primary | ICD-10-CM

## 2024-09-10 DIAGNOSIS — O99.019 ANTEPARTUM ANEMIA: ICD-10-CM

## 2024-09-10 NOTE — PROGRESS NOTES
CC: Follow-Up OB    HPI:   29 y.o.  at 32w6d with EDC of 10/30/2024, by Other Basis, here for her follow up OB visit.  Denies any complaints.    Pregnancy ROS:  Positive fetal movement   Negative leakage of fluid   Negative vaginal bleeding   Negative headache, vision changes, RUQ pain, epigastric pain  Negative contractions/abdominal pain   Negative pelvic pressure     Physical Exam:  Prenatal Vitals  BP: 114/72  Weight: 94.8 kg (209 lb)  Fetal Heart Rate: 140s    Wt Readings from Last 3 Encounters:   09/10/24 94.8 kg (209 lb)   24 94.3 kg (208 lb)   24 94.8 kg (209 lb)       Body mass index is 38.46 kg/m².    General: NAD, well developed, well nourished  Psych: alert and oriented to person, time and place, normal affect  HEENT: normocephalic, atraumatic  Abd: Gravid, soft, NT, ND  Skin: warm, dry  Neuro: normal gait, gross motor function intact  No cyanosis, clubbing or edema    FHTs: 140s  FH: AGA    Maternal Blood Type: O POS    ASSESSMENT: 29 y.o.  @ 32w6d with   1. Encounter for supervision of normal pregnancy in multigravida in third trimester    2. Antepartum anemia         PLAN:  Encounter for supervision of normal pregnancy in multigravida in third trimester    Antepartum anemia       Needs GTT   Pain, fever, bleeding precautions  Pre term Labor, Fetal movement, and Preeclampsia precautions  Cont PNV/feso4  Return to clinic in 2 weeks    
Clothing

## 2024-09-25 ENCOUNTER — PATIENT MESSAGE (OUTPATIENT)
Dept: OTHER | Facility: OTHER | Age: 29
End: 2024-09-25
Payer: MEDICAID

## 2024-09-29 LAB
APPEARANCE, UA: ABNORMAL
BACTERIA SPEC CULT: ABNORMAL /HPF
BILIRUB UR QL STRIP: NEGATIVE MG/DL
COLOR UR: ABNORMAL
GLUCOSE (UA): NORMAL MG/DL
HGB UR QL STRIP: 50 /UL
KETONES UR QL STRIP: ABNORMAL MG/DL
LEUKOCYTE ESTERASE UR QL STRIP: 500 /UL
NITRITE UR QL STRIP: NEGATIVE
PH UR STRIP: 7 PH (ref 5–9)
PROT UR QL STRIP: 30 MG/DL
RBC #/AREA URNS HPF: ABNORMAL /HPF (ref 0–2)
SERVICE COMMENT 03: ABNORMAL
SP GR UR STRIP: 1.01 (ref 1–1.03)
SPECIMEN COLLECTION METHOD, URINE: ABNORMAL
SQUAMOUS EPITHELIAL, UA: ABNORMAL /LPF
UROBILINOGEN UR STRIP-ACNC: 4 MG/DL
WBC #/AREA URNS HPF: ABNORMAL /HPF (ref 0–5)

## 2024-10-01 DIAGNOSIS — O23.10 CYSTITIS DURING PREGNANCY, ANTEPARTUM: Primary | ICD-10-CM

## 2024-10-01 RX ORDER — NITROFURANTOIN 25; 75 MG/1; MG/1
100 CAPSULE ORAL 2 TIMES DAILY
Qty: 10 CAPSULE | Refills: 0 | Status: SHIPPED | OUTPATIENT
Start: 2024-10-01 | End: 2024-10-06

## 2024-10-02 LAB — URINE CULTURE, ROUTINE: NORMAL

## 2024-10-07 ENCOUNTER — ROUTINE PRENATAL (OUTPATIENT)
Dept: OBSTETRICS AND GYNECOLOGY | Facility: CLINIC | Age: 29
End: 2024-10-07
Payer: MEDICAID

## 2024-10-07 VITALS
HEART RATE: 83 BPM | DIASTOLIC BLOOD PRESSURE: 76 MMHG | WEIGHT: 208 LBS | BODY MASS INDEX: 38.28 KG/M2 | SYSTOLIC BLOOD PRESSURE: 123 MMHG

## 2024-10-07 DIAGNOSIS — Z34.83 ENCOUNTER FOR SUPERVISION OF NORMAL PREGNANCY IN MULTIGRAVIDA IN THIRD TRIMESTER: Primary | ICD-10-CM

## 2024-10-07 PROCEDURE — 99213 OFFICE O/P EST LOW 20 MIN: CPT | Mod: S$PBB,TH,, | Performed by: OBSTETRICS & GYNECOLOGY

## 2024-10-09 LAB
GROUP B STREP MOLECULAR: POSITIVE
PENICILLIN ALLERGIC: NO

## 2024-10-14 ENCOUNTER — ROUTINE PRENATAL (OUTPATIENT)
Dept: OBSTETRICS AND GYNECOLOGY | Facility: CLINIC | Age: 29
End: 2024-10-14
Payer: MEDICAID

## 2024-10-14 VITALS
HEART RATE: 102 BPM | DIASTOLIC BLOOD PRESSURE: 76 MMHG | SYSTOLIC BLOOD PRESSURE: 117 MMHG | BODY MASS INDEX: 39.38 KG/M2 | WEIGHT: 214 LBS

## 2024-10-14 DIAGNOSIS — Z34.83 ENCOUNTER FOR SUPERVISION OF NORMAL PREGNANCY IN MULTIGRAVIDA IN THIRD TRIMESTER: Primary | ICD-10-CM

## 2024-10-14 PROCEDURE — 99213 OFFICE O/P EST LOW 20 MIN: CPT | Mod: S$PBB,TH,, | Performed by: OBSTETRICS & GYNECOLOGY

## 2024-10-14 NOTE — PROGRESS NOTES
Subjective:       Patient ID: Belle Gary is a 29 y.o.  at 37w5d     Chief Complaint:  Routine Prenatal Visit      History of Present Illness  No complaints. Reports normal sx. Labs and history reviewed with pt.         Review of Systems  Denies n/v, f/c, dysuria, contractions,   VD, VB, round ligament pain, headaches, preE ROS       Objective:     Vitals:    10/14/24 0737   BP: 117/76   Pulse: 102     Wt Readings from Last 3 Encounters:   10/14/24 97.1 kg (214 lb)   10/07/24 94.3 kg (208 lb)   09/10/24 94.8 kg (209 lb)       nad  NCAT  pupils normal size  Skin nml no rashes or lesions  No resp distress, resp even and unlabored  Gravid nt, no rebound no guarding  No cyanosis or clubbing, edema appropriate for pregn    FHT: 150's  CVX: 1 th h    Assessment:        1. Encounter for supervision of normal pregnancy in multigravida in third trimester                Plan:        Encouraged PNV  Pain, fever, bleeding precautions   RTC 1 weeks

## 2024-10-20 LAB
APPEARANCE, UA: ABNORMAL
BACTERIA SPEC CULT: ABNORMAL /HPF
BILIRUB UR QL STRIP: NEGATIVE MG/DL
COLOR UR: ABNORMAL
GLUCOSE (UA): NORMAL MG/DL
HGB UR QL STRIP: 50 /UL
KETONES UR QL STRIP: NEGATIVE MG/DL
LEUKOCYTE ESTERASE UR QL STRIP: 500 /UL
MUCUS URINE: ABNORMAL /LPF
NITRITE UR QL STRIP: NEGATIVE
PH UR STRIP: 6 PH (ref 5–9)
PROT UR QL STRIP: 30 MG/DL
RBC #/AREA URNS HPF: ABNORMAL /HPF (ref 0–2)
SERVICE COMMENT 03: ABNORMAL
SP GR UR STRIP: 1.02 (ref 1–1.03)
SPECIMEN COLLECTION METHOD, URINE: ABNORMAL
SQUAMOUS EPITHELIAL, UA: ABNORMAL /LPF
UROBILINOGEN UR STRIP-ACNC: NORMAL MG/DL
WBC #/AREA URNS HPF: ABNORMAL /HPF (ref 0–5)
YEAST URINE: ABNORMAL /HPF

## 2024-10-21 ENCOUNTER — OUTSIDE PLACE OF SERVICE (OUTPATIENT)
Dept: OBSTETRICS AND GYNECOLOGY | Facility: CLINIC | Age: 29
End: 2024-10-21

## 2024-10-21 ENCOUNTER — ROUTINE PRENATAL (OUTPATIENT)
Dept: OBSTETRICS AND GYNECOLOGY | Facility: CLINIC | Age: 29
End: 2024-10-21
Payer: MEDICAID

## 2024-10-21 VITALS
DIASTOLIC BLOOD PRESSURE: 75 MMHG | SYSTOLIC BLOOD PRESSURE: 128 MMHG | HEART RATE: 83 BPM | WEIGHT: 212 LBS | BODY MASS INDEX: 39.01 KG/M2

## 2024-10-21 DIAGNOSIS — Z34.83 ENCOUNTER FOR SUPERVISION OF NORMAL PREGNANCY IN MULTIGRAVIDA IN THIRD TRIMESTER: Primary | ICD-10-CM

## 2024-10-21 PROCEDURE — 99213 OFFICE O/P EST LOW 20 MIN: CPT | Mod: S$PBB,TH,, | Performed by: OBSTETRICS & GYNECOLOGY

## 2024-10-21 NOTE — PROGRESS NOTES
Subjective:       Patient ID: Belle Gary is a 29 y.o.  at 38w5d     Chief Complaint:  No chief complaint on file.      History of Present Illness        Reports dec fetal movement        Review of Systems  Denies n/v, f/c, dysuria, contractions,   VD, VB, round ligament pain, headaches, preE ROS       Objective:   There were no vitals filed for this visit.  Wt Readings from Last 3 Encounters:   10/14/24 97.1 kg (214 lb)   10/07/24 94.3 kg (208 lb)   09/10/24 94.8 kg (209 lb)       nad  NCAT  pupils normal size  Skin nml no rashes or lesions  No resp distress, resp even and unlabored  Gravid nt, no rebound no guarding  No cyanosis or clubbing, edema appropriate for pregn    FHT: 150's  CVX: 3-4    Assessment:        1. Encounter for supervision of normal pregnancy in multigravida in third trimester                Plan:     Encouraged PNV  Pain, fever, bleeding precautions       To labor unit

## 2024-10-23 LAB — URINE CULTURE, ROUTINE: NORMAL
